# Patient Record
Sex: MALE | Race: WHITE | Employment: STUDENT | URBAN - METROPOLITAN AREA
[De-identification: names, ages, dates, MRNs, and addresses within clinical notes are randomized per-mention and may not be internally consistent; named-entity substitution may affect disease eponyms.]

---
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
## 2022-01-03 ENCOUNTER — OFFICE VISIT (OUTPATIENT)
Dept: GASTROENTEROLOGY | Facility: CLINIC | Age: 22
End: 2022-01-03
Payer: COMMERCIAL

## 2022-01-03 VITALS
WEIGHT: 132 LBS | SYSTOLIC BLOOD PRESSURE: 118 MMHG | HEIGHT: 68 IN | TEMPERATURE: 98 F | DIASTOLIC BLOOD PRESSURE: 54 MMHG | BODY MASS INDEX: 20 KG/M2 | HEART RATE: 83 BPM

## 2022-01-03 DIAGNOSIS — Z83.79 FAMILY HISTORY OF CELIAC DISEASE: ICD-10-CM

## 2022-01-03 DIAGNOSIS — R11.10 POSTPRANDIAL VOMITING: ICD-10-CM

## 2022-01-03 DIAGNOSIS — R68.81 EARLY SATIETY: Primary | ICD-10-CM

## 2022-01-03 DIAGNOSIS — R14.0 ABDOMINAL BLOATING: ICD-10-CM

## 2022-01-03 PROCEDURE — 3074F SYST BP LT 130 MM HG: CPT | Performed by: INTERNAL MEDICINE

## 2022-01-03 PROCEDURE — 3078F DIAST BP <80 MM HG: CPT | Performed by: INTERNAL MEDICINE

## 2022-01-03 PROCEDURE — 99214 OFFICE O/P EST MOD 30 MIN: CPT | Performed by: INTERNAL MEDICINE

## 2022-01-03 PROCEDURE — 3008F BODY MASS INDEX DOCD: CPT | Performed by: INTERNAL MEDICINE

## 2022-01-03 NOTE — PROGRESS NOTES
HPI:    Patient ID: Jael Victoria is a fit and athletic 25year old man a bit thin, BMI 20.07 today who returns for follow-up. 132 lbs today which is stable/improved from last time.   During the latest wave of the COVID-19 pandemic, mom is waiting out in t Food Intake Disorder (ARFID) as below. Before we called mom, Alison Moreno describes regular vaping of nicotine and THC. He actually asks me whether THC could be driving his symptoms.       Body mass index is 20.07 kg/m².   ====================  Initial visit 6/1 No GERD symptoms. Taking Pepcid once daily or less on advice of his pediatrician without much subjective benefit. No abdominal surgical history. Family history:  Father with celiac disease. Kay Fabien has been tested with the TTG serologies, negative.   Joanna Meade FINDINGS:   LIVER: Normal size, echotexture and colorflow.  No significant masses.    GALLBLADDER: Normal size.  No calculi, wall thickening or pericholecystic fluid.  Sonographic Gerardo's sign was not elicited.     BILE DUCTS: Normal.  Common bile duct m esophagus and GE junction, Z line. Photographs taken of normal esophageal mucosa.     Stomach: Clear secretions present. Normal proximal stomach and body of stomach.   Some patchy erythema, erythematous streaks radiating out from the pylorus, antral mucos significant weight loss, some of which has been regained. Reassuring, normal screening labs including celiac serologies above July 2019.     Reassuring initial work-up June and July 2020 with abdominal ultrasound, EGD examination with gastric and duodena THC to rule out that as a possibility.   · Consideration for CT scan abdomen and pelvis to rule out congenital/ anatomical problem such as gut malrotation, any other anatomic process including SMA syndrome; inflammatory bowel disease with stricture causing